# Patient Record
Sex: FEMALE
[De-identification: names, ages, dates, MRNs, and addresses within clinical notes are randomized per-mention and may not be internally consistent; named-entity substitution may affect disease eponyms.]

---

## 2017-01-02 PROBLEM — Z00.00 ENCOUNTER FOR PREVENTIVE HEALTH EXAMINATION: Status: ACTIVE | Noted: 2017-01-02

## 2017-01-31 ENCOUNTER — APPOINTMENT (OUTPATIENT)
Dept: CARDIOLOGY | Facility: CLINIC | Age: 72
End: 2017-01-31

## 2017-03-13 ENCOUNTER — APPOINTMENT (OUTPATIENT)
Dept: CARDIOLOGY | Facility: CLINIC | Age: 72
End: 2017-03-13

## 2017-03-21 ENCOUNTER — APPOINTMENT (OUTPATIENT)
Dept: CARDIOLOGY | Facility: CLINIC | Age: 72
End: 2017-03-21

## 2019-03-26 ENCOUNTER — APPOINTMENT (OUTPATIENT)
Dept: CARDIOLOGY | Facility: CLINIC | Age: 74
End: 2019-03-26
Payer: MEDICARE

## 2019-03-26 ENCOUNTER — NON-APPOINTMENT (OUTPATIENT)
Age: 74
End: 2019-03-26

## 2019-03-26 VITALS
DIASTOLIC BLOOD PRESSURE: 62 MMHG | SYSTOLIC BLOOD PRESSURE: 128 MMHG | BODY MASS INDEX: 20.22 KG/M2 | HEART RATE: 74 BPM | WEIGHT: 103 LBS | HEIGHT: 60 IN

## 2019-03-26 DIAGNOSIS — Z78.9 OTHER SPECIFIED HEALTH STATUS: ICD-10-CM

## 2019-03-26 DIAGNOSIS — K31.89 OTHER DISEASES OF STOMACH AND DUODENUM: ICD-10-CM

## 2019-03-26 DIAGNOSIS — Z87.19 PERSONAL HISTORY OF OTHER DISEASES OF THE DIGESTIVE SYSTEM: ICD-10-CM

## 2019-03-26 DIAGNOSIS — R94.31 ABNORMAL ELECTROCARDIOGRAM [ECG] [EKG]: ICD-10-CM

## 2019-03-26 DIAGNOSIS — G47.00 INSOMNIA, UNSPECIFIED: ICD-10-CM

## 2019-03-26 DIAGNOSIS — M81.0 AGE-RELATED OSTEOPOROSIS W/OUT CURRENT PATHOLOGICAL FRACTURE: ICD-10-CM

## 2019-03-26 DIAGNOSIS — Z82.49 FAMILY HISTORY OF ISCHEMIC HEART DISEASE AND OTHER DISEASES OF THE CIRCULATORY SYSTEM: ICD-10-CM

## 2019-03-26 DIAGNOSIS — Z85.3 PERSONAL HISTORY OF MALIGNANT NEOPLASM OF BREAST: ICD-10-CM

## 2019-03-26 DIAGNOSIS — Z87.891 PERSONAL HISTORY OF NICOTINE DEPENDENCE: ICD-10-CM

## 2019-03-26 DIAGNOSIS — R07.89 OTHER CHEST PAIN: ICD-10-CM

## 2019-03-26 PROCEDURE — 93000 ELECTROCARDIOGRAM COMPLETE: CPT

## 2019-03-26 PROCEDURE — 99214 OFFICE O/P EST MOD 30 MIN: CPT

## 2019-03-26 RX ORDER — ZOLPIDEM TARTRATE 5 MG/1
TABLET, FILM COATED ORAL
Refills: 0 | Status: ACTIVE | COMMUNITY

## 2019-03-26 RX ORDER — CHLORHEXIDINE GLUCONATE 4 %
400 LIQUID (ML) TOPICAL DAILY
Refills: 0 | Status: ACTIVE | COMMUNITY

## 2019-03-26 RX ORDER — BUTYROSPERMUM PARKII(SHEA BUTTER), SIMMONDSIA CHINENSIS (JOJOBA) SEED OIL, ALOE BARBADENSIS LEAF EXTRACT .01; 1; 3.5 G/100G; G/100G; G/100G
LIQUID TOPICAL
Refills: 0 | Status: ACTIVE | COMMUNITY

## 2019-03-26 NOTE — REASON FOR VISIT
[Follow-Up - Clinic] : a clinic follow-up of [Abnormal ECG] : an abnormal ECG [Chest Pain] : chest pain [Hyperlipidemia] : hyperlipidemia [Mitral Regurgitation] : mitral regurgitation

## 2019-03-28 NOTE — HISTORY OF PRESENT ILLNESS
[FreeTextEntry1] : TENZIN SERVIN  is a 73 year old  F\par History of breast cancer, osteoporosis, hyperlipidemia, peptic ulcer disease, esophagitis, and family history of cardiovascular disease, mod VHD-PH\par No prior history of clinical myocardial infarction, coronary revascularization, symptomatic angina, rheumatic heart disease, cyanotic congenital heart disease, cerebrovascular disease, atrial fibrillation, diabetes,  hypertension, DVT, or renal insufficiency.  There is a family history of cardiovascular disease.  Her brother  of cardiovascular disease, had prior stent.  Her  father had recurrent myocardial infarction, first at the age of 65.  \par \par Chronic symptoms of atypical chest discomfort.  She was seen by outside gastroenterology group.  She was diagnosed with a small ulcer, erosive  \par gastropathy and esophagitis.   \par \par She remains physically active.  She typically walks.  \par She intermittently has chest discomfort. She describes it feels achy in her axilla. \par She also had sharp viselike pain beneath her ribs\par There is no significant dyspnea on exertion or orthopnea.  \par There are no  symptomatic palpitations, lightheadedness, dizziness, or syncope.  \par Her gynecologist asked about coronary calcification. \par She is reluctant to take medical therapy\par \par EKG normal sinus rhythm and poor r wave progression. \par \par Carotid Doppler. 2017. Normal arteries no stenosis. \par ETT. Wilmer protocol. 9 minutes. Peak heart rate greater than 100% at peak. No symptoms. Nonspecific equivocal EKG changes. \par Echocardiogram. Personally reviewed. 2017. Normal left ventricular function, moderate mitral regurgitation, mild to moderate aortic regurgitation, mild to  \par moderate tricuspid regurgitation, moderate pulmonary hypertension. \par \par 2019. Total cholesterol 265

## 2019-03-28 NOTE — ASSESSMENT
[FreeTextEntry1] : Chest discomfort. Abnormal EKG. Hyperlipidemia.\par Valvular heart disease. \par Equivocal ETT \par Echocardiogram will be performed to evaluate LV size function and degree of regurgitation. \par Stress echocardiogram to evaluate cardiovascular including pulmonary pressure response to exercise. \par Coronary artery calcium score for further CV risk stratification. \par She is very reluctant to take medical therapy.\par Does not require SBE prophylaxis. \par Reviewed clinical red flags such as change in exercise tolerance, exertional symptoms, escalating symptoms which would warrant urgent reevaluation.\par

## 2019-03-28 NOTE — PHYSICAL EXAM
[General Appearance - Well Developed] : well developed [Normal Appearance] : normal appearance [Well Groomed] : well groomed [General Appearance - Well Nourished] : well nourished [No Deformities] : no deformities [General Appearance - In No Acute Distress] : no acute distress [Normal Conjunctiva] : the conjunctiva exhibited no abnormalities [Eyelids - No Xanthelasma] : the eyelids demonstrated no xanthelasmas [Normal Oral Mucosa] : normal oral mucosa [No Oral Pallor] : no oral pallor [No Oral Cyanosis] : no oral cyanosis [Normal Jugular Venous A Waves Present] : normal jugular venous A waves present [Normal Jugular Venous V Waves Present] : normal jugular venous V waves present [No Jugular Venous Chery A Waves] : no jugular venous chery A waves [Respiration, Rhythm And Depth] : normal respiratory rhythm and effort [Exaggerated Use Of Accessory Muscles For Inspiration] : no accessory muscle use [Auscultation Breath Sounds / Voice Sounds] : lungs were clear to auscultation bilaterally [Heart Rate And Rhythm] : heart rate and rhythm were normal [Heart Sounds] : normal S1 and S2 [Abdomen Soft] : soft [Abdomen Tenderness] : non-tender [Abdomen Mass (___ Cm)] : no abdominal mass palpated [Abnormal Walk] : normal gait [Gait - Sufficient For Exercise Testing] : the gait was sufficient for exercise testing [Nail Clubbing] : no clubbing of the fingernails [Cyanosis, Localized] : no localized cyanosis [Petechial Hemorrhages (___cm)] : no petechial hemorrhages [Skin Color & Pigmentation] : normal skin color and pigmentation [] : no rash [No Venous Stasis] : no venous stasis [Skin Lesions] : no skin lesions [No Skin Ulcers] : no skin ulcer [No Xanthoma] : no  xanthoma was observed [Oriented To Time, Place, And Person] : oriented to person, place, and time [Affect] : the affect was normal [Mood] : the mood was normal [No Anxiety] : not feeling anxious [FreeTextEntry1] : HSM apex

## 2019-04-16 ENCOUNTER — APPOINTMENT (OUTPATIENT)
Dept: CARDIOLOGY | Facility: CLINIC | Age: 74
End: 2019-04-16
Payer: MEDICARE

## 2019-04-16 PROCEDURE — 93306 TTE W/DOPPLER COMPLETE: CPT

## 2019-04-18 ENCOUNTER — APPOINTMENT (OUTPATIENT)
Dept: CARDIOLOGY | Facility: CLINIC | Age: 74
End: 2019-04-18
Payer: MEDICARE

## 2019-04-18 PROCEDURE — 93351 STRESS TTE COMPLETE: CPT

## 2019-04-29 ENCOUNTER — TRANSCRIPTION ENCOUNTER (OUTPATIENT)
Age: 74
End: 2019-04-29

## 2019-04-30 ENCOUNTER — APPOINTMENT (OUTPATIENT)
Dept: CARDIOLOGY | Facility: CLINIC | Age: 74
End: 2019-04-30
Payer: MEDICARE

## 2019-04-30 VITALS
DIASTOLIC BLOOD PRESSURE: 72 MMHG | WEIGHT: 103 LBS | OXYGEN SATURATION: 98 % | HEIGHT: 60 IN | SYSTOLIC BLOOD PRESSURE: 126 MMHG | BODY MASS INDEX: 20.22 KG/M2 | HEART RATE: 83 BPM

## 2019-04-30 PROCEDURE — 99214 OFFICE O/P EST MOD 30 MIN: CPT

## 2019-05-01 NOTE — HISTORY OF PRESENT ILLNESS
[FreeTextEntry1] : TENZIN SERVIN  is a 73 year old  F\par History of breast cancer, osteoporosis, hyperlipidemia, peptic ulcer disease, esophagitis, and family history of cardiovascular disease, mod VHD-PH\par No prior history of clinical myocardial infarction, coronary revascularization, symptomatic angina, rheumatic heart disease, cyanotic congenital heart disease, cerebrovascular disease, atrial fibrillation, diabetes,  hypertension, DVT, or renal insufficiency.  There is a family history of cardiovascular disease.  Her brother had prior stent.  Her  father had recurrent myocardial infarction, first at the age of 65.  \par \par 1-2 episodes of atypical chest discomfort.  She was seen by outside gastroenterology group.  She was diagnosed with a small ulcer, erosive.  Currently resolved. \par gastropathy and esophagitis.   \par \par She remains physically active.  She typically walks.  \par Episode of chest discomfort. She describes an ache in her axilla. \par She also had sharp viselike pain beneath her ribs\par There is no significant dyspnea on exertion or orthopnea.  \par There are no  symptomatic palpitations, lightheadedness, dizziness, or syncope.  \par Her gynecologist asked about coronary calcification. \par She is reluctant to take medical therapy\par \par 4/16/19. Echocardiogram. If home 60%. Mild MR with MV buckling.  Mild to moderate AR. Mild PI and TR. Mild diastolic dysfunction. No pericardial effusion. No LVH. Normal PASP.\par \par 4/18/19. Stress echocardiogram. Completed 10 minutes 45 seconds of Wilmer protocol achieving 110% of MPHR.  Peak BP: 156/88 mmHg. No evidence of ischemia by EKG and normal LV augmentation. Significant lung interference. Questionable elevation of PASP to 50 mmHg.  Patient asymptomatic.\par \par \par EKG normal sinus rhythm and poor r wave progression. \par \par Carotid Doppler. March 2017. Normal arteries no stenosis. \par ETT. Wilmer protocol. 9 minutes. Peak heart rate greater than 100% at peak. No symptoms. Nonspecific equivocal EKG changes. \par Echocardiogram. March 2017. Normal left ventricular function, moderate mitral regurgitation, mild to moderate aortic regurgitation, mild to  moderate tricuspid regurgitation, moderate pulmonary hypertension. \par \par January 2019. Total cholesterol 265

## 2019-05-01 NOTE — ADDENDUM
[FreeTextEntry1] : Please note the patient was reviewed with PA Kevin Bird.\koby I was physically present during the service of the patient and was directly involved in the management plan and recommendations of care provided to the patient. \par I personally reviewed the history and physical examination as documented by the PA above\par

## 2019-05-01 NOTE — ASSESSMENT
[FreeTextEntry1] : Chest discomfort. Abnormal EKG. Hyperlipidemia.\par Valvular heart disease. Mild-moderate AR and buckling of MV with mild MV.  \par Equivocal ETT.  Negative SECHO.  Coronary CT score of 3.4.  Minimal identifiable plaque.  \par She is very reluctant to take medical therapy.  Will investigate Crestor and Lipitor and call us back. \par Does not require SBE prophylaxis. \par Reviewed clinical red flags such as change in exercise tolerance, exertional symptoms, escalating symptoms which would warrant urgent reevaluation.

## 2019-12-04 ENCOUNTER — RECORD ABSTRACTING (OUTPATIENT)
Age: 74
End: 2019-12-04

## 2019-12-17 ENCOUNTER — APPOINTMENT (OUTPATIENT)
Dept: CARDIOLOGY | Facility: CLINIC | Age: 74
End: 2019-12-17
Payer: MEDICARE

## 2019-12-17 VITALS
DIASTOLIC BLOOD PRESSURE: 70 MMHG | BODY MASS INDEX: 19.24 KG/M2 | OXYGEN SATURATION: 99 % | SYSTOLIC BLOOD PRESSURE: 130 MMHG | WEIGHT: 98 LBS | HEIGHT: 60 IN | HEART RATE: 76 BPM

## 2019-12-17 DIAGNOSIS — I36.1 NONRHEUMATIC TRICUSPID (VALVE) INSUFFICIENCY: ICD-10-CM

## 2019-12-17 PROCEDURE — 99214 OFFICE O/P EST MOD 30 MIN: CPT

## 2019-12-17 RX ORDER — MAG HYDROX/ALUMINUM HYD/SIMETH 400-400-40
SUSPENSION, ORAL (FINAL DOSE FORM) ORAL
Refills: 0 | Status: DISCONTINUED | COMMUNITY
End: 2019-12-17

## 2019-12-17 NOTE — REASON FOR VISIT
[Follow-Up - Clinic] : a clinic follow-up of [Chest Pain] : chest pain [Abnormal ECG] : an abnormal ECG [Mitral Regurgitation] : mitral regurgitation [Hyperlipidemia] : hyperlipidemia

## 2019-12-29 NOTE — HISTORY OF PRESENT ILLNESS
[FreeTextEntry1] : TENZIN SERVIN  is a 74 year old  F\par History of CAC, breast cancer, osteoporosis, hyperlipidemia, peptic ulcer disease, esophagitis, and family history of cardiovascular disease, VHD-PH\par No prior history of clinical myocardial infarction, coronary revascularization, symptomatic angina, rheumatic heart disease, cyanotic congenital heart disease, cerebrovascular disease, atrial fibrillation, diabetes,  hypertension, DVT, or renal insufficiency.  There is a family history of cardiovascular disease.  Her brother  of cardiovascular disease, had prior stent.  Her  father had recurrent myocardial infarction, first at the age of 65.  \par \par She remains physically active.  She typically walks.  \par There is no significant dyspnea on exertion or orthopnea.  \par There are no  symptomatic palpitations, lightheadedness, dizziness, or syncope.  \par She is reluctant to take medical therapy\par \par Coronary calcium score, 2019, calcium score 3.4, 25th percentile. \par Echocardiogram, 2019, normal left ventricular function. Mild mitral regurgitation. Mild to moderate aortic regurgitation \par Stress echocardiogram, 2019. Normal augmentation of LV systolic function. Moderate pulmonary hypertension with exercise. \par EKG normal sinus rhythm and poor r wave progression. \par Carotid Doppler. 2017. Normal arteries no stenosis. \par 2019. Total cholesterol 265 \par \par

## 2019-12-29 NOTE — ASSESSMENT
[FreeTextEntry1] : CAC\par VHD\par Hyperlipidemia.\par \par No significant ischemic heart disease. \par She is reluctant to take medications. \par Upcoming blood work has been requested. \par Follow up echocardiogram\par \par Does not require SBE prophylaxis. \par Reviewed clinical red flags such as change in exercise tolerance, exertional symptoms, escalating symptoms which would warrant urgent reevaluation.\par \par \par \par  \par \par \par \par

## 2020-03-17 ENCOUNTER — APPOINTMENT (OUTPATIENT)
Dept: CARDIOLOGY | Facility: CLINIC | Age: 75
End: 2020-03-17

## 2020-05-05 ENCOUNTER — APPOINTMENT (OUTPATIENT)
Dept: CARDIOLOGY | Facility: CLINIC | Age: 75
End: 2020-05-05
Payer: MEDICARE

## 2020-05-05 PROCEDURE — 93306 TTE W/DOPPLER COMPLETE: CPT

## 2020-06-03 ENCOUNTER — APPOINTMENT (OUTPATIENT)
Dept: CARDIOLOGY | Facility: CLINIC | Age: 75
End: 2020-06-03
Payer: MEDICARE

## 2020-06-03 DIAGNOSIS — R07.9 CHEST PAIN, UNSPECIFIED: ICD-10-CM

## 2020-06-03 PROCEDURE — 99442: CPT | Mod: 95

## 2021-01-19 ENCOUNTER — APPOINTMENT (OUTPATIENT)
Dept: CARDIOLOGY | Facility: CLINIC | Age: 76
End: 2021-01-19
Payer: MEDICARE

## 2021-01-19 VITALS
SYSTOLIC BLOOD PRESSURE: 124 MMHG | WEIGHT: 102 LBS | TEMPERATURE: 98 F | DIASTOLIC BLOOD PRESSURE: 74 MMHG | BODY MASS INDEX: 20.03 KG/M2 | HEART RATE: 92 BPM | OXYGEN SATURATION: 95 % | HEIGHT: 60 IN | RESPIRATION RATE: 16 BRPM

## 2021-01-19 DIAGNOSIS — E78.5 HYPERLIPIDEMIA, UNSPECIFIED: ICD-10-CM

## 2021-01-19 DIAGNOSIS — I25.10 ATHEROSCLEROTIC HEART DISEASE OF NATIVE CORONARY ARTERY W/OUT ANGINA PECTORIS: ICD-10-CM

## 2021-01-19 DIAGNOSIS — I34.0 NONRHEUMATIC MITRAL (VALVE) INSUFFICIENCY: ICD-10-CM

## 2021-01-19 DIAGNOSIS — I25.84 ATHEROSCLEROTIC HEART DISEASE OF NATIVE CORONARY ARTERY W/OUT ANGINA PECTORIS: ICD-10-CM

## 2021-01-19 DIAGNOSIS — Z82.49 FAMILY HISTORY OF ISCHEMIC HEART DISEASE AND OTHER DISEASES OF THE CIRCULATORY SYSTEM: ICD-10-CM

## 2021-01-19 DIAGNOSIS — I35.1 NONRHEUMATIC AORTIC (VALVE) INSUFFICIENCY: ICD-10-CM

## 2021-01-19 PROCEDURE — 93000 ELECTROCARDIOGRAM COMPLETE: CPT

## 2021-01-19 PROCEDURE — 99214 OFFICE O/P EST MOD 30 MIN: CPT

## 2021-01-19 RX ORDER — MULTIVIT-MIN/IRON/FOLIC ACID/K 18-600-40
500 CAPSULE ORAL
Refills: 0 | Status: ACTIVE | COMMUNITY

## 2021-01-19 RX ORDER — BERBERINE CHLOR/SEAWEED/CHROM 500-250 MG
CAPSULE ORAL
Refills: 0 | Status: DISCONTINUED | COMMUNITY
End: 2021-01-19

## 2021-01-23 ENCOUNTER — NON-APPOINTMENT (OUTPATIENT)
Age: 76
End: 2021-01-23

## 2021-01-23 PROBLEM — Z82.49 FAMILY HISTORY OF MYOCARDIAL INFARCTION: Status: ACTIVE | Noted: 2019-03-26

## 2021-01-23 PROBLEM — I35.1 NONRHEUMATIC AORTIC VALVE INSUFFICIENCY: Status: ACTIVE | Noted: 2019-03-26

## 2021-01-23 PROBLEM — I25.10 CORONARY ARTERY CALCIFICATION: Status: ACTIVE | Noted: 2019-12-17

## 2021-01-23 PROBLEM — I34.0 NONRHEUMATIC MITRAL VALVE INSUFFICIENCY: Status: ACTIVE | Noted: 2019-03-26

## 2021-01-23 PROBLEM — E78.5 ELEVATED LIPIDS: Status: ACTIVE | Noted: 2019-03-26

## 2021-01-23 NOTE — PHYSICAL EXAM
[General Appearance - Well Developed] : well developed [Normal Appearance] : normal appearance [Well Groomed] : well groomed [General Appearance - Well Nourished] : well nourished [No Deformities] : no deformities [General Appearance - In No Acute Distress] : no acute distress [Normal Conjunctiva] : the conjunctiva exhibited no abnormalities [Eyelids - No Xanthelasma] : the eyelids demonstrated no xanthelasmas [Normal Oral Mucosa] : normal oral mucosa [No Oral Pallor] : no oral pallor [No Oral Cyanosis] : no oral cyanosis [Normal Jugular Venous A Waves Present] : normal jugular venous A waves present [Normal Jugular Venous V Waves Present] : normal jugular venous V waves present [No Jugular Venous Hcery A Waves] : no jugular venous chery A waves [Respiration, Rhythm And Depth] : normal respiratory rhythm and effort [Exaggerated Use Of Accessory Muscles For Inspiration] : no accessory muscle use [Auscultation Breath Sounds / Voice Sounds] : lungs were clear to auscultation bilaterally [Heart Rate And Rhythm] : heart rate and rhythm were normal [Heart Sounds] : normal S1 and S2 [Abdomen Soft] : soft [Abdomen Tenderness] : non-tender [Abdomen Mass (___ Cm)] : no abdominal mass palpated [Abnormal Walk] : normal gait [Gait - Sufficient For Exercise Testing] : the gait was sufficient for exercise testing [Nail Clubbing] : no clubbing of the fingernails [Cyanosis, Localized] : no localized cyanosis [Petechial Hemorrhages (___cm)] : no petechial hemorrhages [Skin Color & Pigmentation] : normal skin color and pigmentation [] : no rash [No Venous Stasis] : no venous stasis [Skin Lesions] : no skin lesions [No Skin Ulcers] : no skin ulcer [No Xanthoma] : no  xanthoma was observed [Oriented To Time, Place, And Person] : oriented to person, place, and time [Affect] : the affect was normal [Mood] : the mood was normal [No Anxiety] : not feeling anxious [FreeTextEntry1] : HSM apex

## 2021-01-23 NOTE — HISTORY OF PRESENT ILLNESS
[FreeTextEntry1] : TENZIN SERVIN  is a 75 year old  F\par History of CAC, breast cancer, osteoporosis, hyperlipidemia, peptic ulcer disease, esophagitis, and family history of cardiovascular disease, VHD-PH\par No prior history of clinical myocardial infarction, coronary revascularization, symptomatic angina, rheumatic heart disease, cyanotic congenital heart disease, cerebrovascular disease, atrial fibrillation, diabetes, \par hypertension, DVT, or renal insufficiency.  \par There is a family history of cardiovascular disease.  Her brother  of cardiovascular disease, had prior stent.  Her father had recurrent myocardial infarction, first at the age of 65.  \par \par She remains physically active.  She typically walks.  \par She stretches and does weights.\par There is no significant dyspnea on exertion or orthopnea.  \par There are no symptomatic palpitations, lightheadedness, dizziness, or syncope.  \par She is reluctant to take medical therapy\par \par blood work from 2020 total cholesterol 236  hemoglobin 13.2 creatinine 0.9 A1c 5.6 \par echocardiogram demonstrates normal left ventricular function mild mitral regurgitation moderate aortic regurgitation mild tricuspid regurgitation\par Coronary calcium score, 2019, calcium score 3.4, 25th percentile. \par Stress echocardiogram, 2019. Normal augmentation of LV systolic function. Moderate pulmonary hypertension with exercise. \par EKG normal sinus rhythm and poor r wave progression. \par Carotid Doppler. 2017. Normal arteries no stenosis. \par

## 2021-01-23 NOTE — ASSESSMENT
[FreeTextEntry1] : CAC\par VHD\par Hyperlipidemia.\par \par Reviewed last echocardiogram.  Normal left ventricular function.  No chamber dilatation.  Monitor valvular heart disease.  Does not require SBE prophylaxis.  \par \par ASCVD risk score is elevated \par I discussed indication for lipid-lowering therapy \par Requested upcoming blood work. \par \par She is reluctant to take medications. \par  \par Reviewed clinical red flags such as change in exercise tolerance, exertional symptoms, escalating symptoms which would warrant urgent reevaluation.

## 2021-01-26 ENCOUNTER — NON-APPOINTMENT (OUTPATIENT)
Age: 76
End: 2021-01-26

## 2021-09-27 ENCOUNTER — APPOINTMENT (OUTPATIENT)
Dept: CARDIOLOGY | Facility: CLINIC | Age: 76
End: 2021-09-27

## 2023-09-06 ENCOUNTER — OFFICE (OUTPATIENT)
Dept: URBAN - METROPOLITAN AREA CLINIC 8 | Facility: CLINIC | Age: 78
Setting detail: OPHTHALMOLOGY
End: 2023-09-06
Payer: MEDICARE

## 2023-09-06 DIAGNOSIS — H25.13: ICD-10-CM

## 2023-09-06 DIAGNOSIS — H40.013: ICD-10-CM

## 2023-09-06 PROCEDURE — 92133 CPTRZD OPH DX IMG PST SGM ON: CPT | Performed by: OPHTHALMOLOGY

## 2023-09-06 PROCEDURE — 92014 COMPRE OPH EXAM EST PT 1/>: CPT | Performed by: OPHTHALMOLOGY

## 2023-09-06 ASSESSMENT — REFRACTION_CURRENTRX
OS_ADD: +2.75
OS_OVR_VA: 20/
OD_OVR_VA: 20/
OS_VPRISM_DIRECTION: SV
OD_VPRISM_DIRECTION: SV
OD_ADD: +2.75

## 2023-09-06 ASSESSMENT — REFRACTION_AUTOREFRACTION
OD_AXIS: 114
OD_CYLINDER: -0.75
OS_AXIS: 052
OS_SPHERE: +0.75
OD_SPHERE: -0.25
OS_CYLINDER: -1.25

## 2023-09-06 ASSESSMENT — CONFRONTATIONAL VISUAL FIELD TEST (CVF)
OS_FINDINGS: FULL
OD_FINDINGS: FULL

## 2023-09-06 ASSESSMENT — SPHEQUIV_DERIVED
OD_SPHEQUIV: -0.625
OS_SPHEQUIV: 0.125
OS_SPHEQUIV: -0.125

## 2023-09-06 ASSESSMENT — KERATOMETRY
OD_K2POWER_DIOPTERS: 46.00
OD_AXISANGLE_DEGREES: 031
OS_K1POWER_DIOPTERS: 45.50
METHOD_AUTO_MANUAL: AUTO
OS_AXISANGLE_DEGREES: 144
OS_K2POWER_DIOPTERS: 46.00
OD_K1POWER_DIOPTERS: 45.50

## 2023-09-06 ASSESSMENT — REFRACTION_MANIFEST
OS_VA2: 20/25(J1)
OD_VA2: 20/25(J1)
OD_ADD: +2.75
OD_AXIS: 115
OD_SPHERE: PLANO
OS_SPHERE: +0.50
OD_CYLINDER: -1.00
OS_CYLINDER: -1.25
OD_VA1: 20/40-2
OU_VA: 20/40-2
OS_AXIS: 050
OS_VA1: 20/40-2
OS_ADD: +2.75

## 2023-09-06 ASSESSMENT — AXIALLENGTH_DERIVED
OS_AL: 22.7481
OD_AL: 23.023
OS_AL: 22.839

## 2023-09-06 ASSESSMENT — VISUAL ACUITY
OD_BCVA: 20/40-2
OS_BCVA: 20/50-2

## 2023-09-06 ASSESSMENT — TONOMETRY
OD_IOP_MMHG: 13
OS_IOP_MMHG: 13

## 2024-10-14 ENCOUNTER — OFFICE (OUTPATIENT)
Dept: URBAN - METROPOLITAN AREA CLINIC 8 | Facility: CLINIC | Age: 79
Setting detail: OPHTHALMOLOGY
End: 2024-10-14
Payer: MEDICARE

## 2024-10-14 DIAGNOSIS — H25.13: ICD-10-CM

## 2024-10-14 DIAGNOSIS — H40.013: ICD-10-CM

## 2024-10-14 PROCEDURE — 92014 COMPRE OPH EXAM EST PT 1/>: CPT | Performed by: OPHTHALMOLOGY

## 2024-10-14 PROCEDURE — 92133 CPTRZD OPH DX IMG PST SGM ON: CPT | Performed by: OPHTHALMOLOGY

## 2024-10-14 ASSESSMENT — REFRACTION_MANIFEST
OS_ADD: +3.00
OS_AXIS: 060
OU_VA: 20/40+2
OS_SPHERE: PLANO
OD_VA1: 20/40-2
OD_ADD: +3.00
OS_VA2: 20/25(J1)
OD_SPHERE: -0.50
OD_AXIS: 120
OS_CYLINDER: -0.50
OD_VA2: 20/25(J1)
OD_CYLINDER: -0.50
OS_VA1: 20/50-

## 2024-10-14 ASSESSMENT — VISUAL ACUITY
OS_BCVA: 20/50-
OD_BCVA: 20/60-

## 2024-10-14 ASSESSMENT — REFRACTION_AUTOREFRACTION
OD_SPHERE: -0.50
OS_SPHERE: +1.00
OD_CYLINDER: -1.00
OS_CYLINDER: -1.75
OS_AXIS: 060
OD_AXIS: 118

## 2024-10-14 ASSESSMENT — TONOMETRY
OS_IOP_MMHG: 12
OD_IOP_MMHG: 12

## 2024-10-14 ASSESSMENT — KERATOMETRY
OD_AXISANGLE_DEGREES: 088
METHOD_AUTO_MANUAL: AUTO
OS_K1POWER_DIOPTERS: 45.00
OS_K2POWER_DIOPTERS: 45.75
OD_K2POWER_DIOPTERS: 46.25
OD_K1POWER_DIOPTERS: 46.00
OS_AXISANGLE_DEGREES: 160

## 2024-10-14 ASSESSMENT — REFRACTION_CURRENTRX
OS_OVR_VA: 20/
OD_OVR_VA: 20/
OS_VPRISM_DIRECTION: SV
OS_ADD: +2.75
OD_ADD: +2.75
OD_VPRISM_DIRECTION: SV

## 2024-10-14 ASSESSMENT — CONFRONTATIONAL VISUAL FIELD TEST (CVF)
OD_FINDINGS: FULL
OS_FINDINGS: FULL